# Patient Record
Sex: FEMALE | Race: WHITE | NOT HISPANIC OR LATINO | URBAN - METROPOLITAN AREA
[De-identification: names, ages, dates, MRNs, and addresses within clinical notes are randomized per-mention and may not be internally consistent; named-entity substitution may affect disease eponyms.]

---

## 2020-10-21 PROBLEM — Z00.00 ENCOUNTER FOR PREVENTIVE HEALTH EXAMINATION: Status: ACTIVE | Noted: 2020-10-21

## 2020-11-09 ENCOUNTER — OUTPATIENT (OUTPATIENT)
Dept: OUTPATIENT SERVICES | Facility: HOSPITAL | Age: 50
LOS: 1 days | Discharge: HOME | End: 2020-11-09

## 2020-11-09 DIAGNOSIS — D68.9 COAGULATION DEFECT, UNSPECIFIED: ICD-10-CM

## 2020-11-24 ENCOUNTER — APPOINTMENT (OUTPATIENT)
Dept: HEMATOLOGY ONCOLOGY | Facility: CLINIC | Age: 50
End: 2020-11-24
Payer: COMMERCIAL

## 2020-11-24 DIAGNOSIS — R23.8 OTHER SKIN CHANGES: ICD-10-CM

## 2020-11-24 DIAGNOSIS — Z78.9 OTHER SPECIFIED HEALTH STATUS: ICD-10-CM

## 2020-11-24 DIAGNOSIS — D89.40 MAST CELL ACTIVATION, UNSPECIFIED: ICD-10-CM

## 2020-11-24 PROCEDURE — 99205 OFFICE O/P NEW HI 60 MIN: CPT | Mod: 95

## 2020-11-25 RX ORDER — KETOTIFEN FUMARATE 0.25 MG/ML
0.03 SOLUTION/ DROPS OPHTHALMIC
Refills: 0 | Status: ACTIVE | COMMUNITY
Start: 2020-11-25

## 2020-11-25 NOTE — ASSESSMENT
[FreeTextEntry1] : This is a 50 year old woman with a complex medical history, ischemic colitis in 2002, negative hypergoagulable state workup, patient had a transiently positive antiphospholipid antibody.  Given that this was long ago and had been worked up and has not resurfaced as a problem since.  Will focus on the bleeding issues.  Patient has a short PTT recently when it was normal in the past, and easy bruising and bleeding and epistaxis.  Explained that the short PTT does not predict for hypercoagulable states,. Will run a bleeding workup which may also explain the lower PTT.  CHeck VWF Ag, Act, and multimer study, PT/PTT with mixing study, Factors V, VIII, IX, X, XI, XII along with Fibrinogen rule out bleeding disorder.  Would typically request a platelet aggregation but given patient is on multiple antihistamines and is quite symptomatic without them, will defer this for now.  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  Recommend she check a tryptase level during episodes of symptoms as she states that her tryptase had never been very elevated.  Can run KIT mutation. Patient already had flow cytometry run which identified an increased population of NK/T cell she states.  \par \par Explained to patient that the abnormal ferritin of 10 is a minor issue, the improtant aspect of these iron parameters are the normal hg of >13, and normal MCV, suggesting that her iron storage is mostly normal. She just needs some additional iron in her diet.  Given hx of IBS would not try supplements, can make the same effect with dietary iron through red meat.   \par \par \par Spent > 75 minutes in direct patient care and and addressed all questions and concerns.  100% of this time was in direct patient contact via [employer].
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
## 2020-11-25 NOTE — REVIEW OF SYSTEMS
[FreeTextEntry7] : Imitable bowel syndrome, hx of ischemic colitis.   [FreeTextEntry9] : lower back pain, and neck pain.  SI joints, left hip.  Subluxations in shoulder, right knee.   [de-identified] : occasional rash with the mast cell syndrome.  Poor wound healing.   [de-identified] : Weakness in legs, neuropathies, small fiber neuropathy negative.

## 2021-07-13 ENCOUNTER — NEW PATIENT (OUTPATIENT)
Dept: URBAN - METROPOLITAN AREA CLINIC 39 | Facility: CLINIC | Age: 51
End: 2021-07-13

## 2021-07-13 DIAGNOSIS — H43.393: ICD-10-CM

## 2021-07-13 DIAGNOSIS — H25.13: ICD-10-CM

## 2021-07-13 PROCEDURE — 92201 OPSCPY EXTND RTA DRAW UNI/BI: CPT

## 2021-07-13 PROCEDURE — 92250 FUNDUS PHOTOGRAPHY W/I&R: CPT

## 2021-07-13 PROCEDURE — 92134 CPTRZ OPH DX IMG PST SGM RTA: CPT

## 2021-07-13 PROCEDURE — 99203 OFFICE O/P NEW LOW 30 MIN: CPT

## 2021-07-13 ASSESSMENT — TONOMETRY
OS_IOP_MMHG: 14
OD_IOP_MMHG: 14

## 2021-07-13 ASSESSMENT — VISUAL ACUITY
OD_CC: 20/16
OS_CC: 20/20

## 2021-12-03 ENCOUNTER — NON-APPOINTMENT (OUTPATIENT)
Age: 51
End: 2021-12-03

## 2021-12-08 ENCOUNTER — OUTPATIENT (OUTPATIENT)
Dept: OUTPATIENT SERVICES | Facility: HOSPITAL | Age: 51
LOS: 1 days | Discharge: HOME | End: 2021-12-08

## 2021-12-08 DIAGNOSIS — D68.9 COAGULATION DEFECT, UNSPECIFIED: ICD-10-CM

## 2021-12-09 ENCOUNTER — OUTPATIENT (OUTPATIENT)
Dept: OUTPATIENT SERVICES | Facility: HOSPITAL | Age: 51
LOS: 1 days | Discharge: HOME | End: 2021-12-09

## 2021-12-09 DIAGNOSIS — D68.9 COAGULATION DEFECT, UNSPECIFIED: ICD-10-CM

## 2021-12-13 ENCOUNTER — APPOINTMENT (OUTPATIENT)
Dept: HEMATOLOGY ONCOLOGY | Facility: CLINIC | Age: 51
End: 2021-12-13
Payer: COMMERCIAL

## 2021-12-13 PROCEDURE — 99214 OFFICE O/P EST MOD 30 MIN: CPT | Mod: 95

## 2021-12-14 NOTE — ASSESSMENT
[FreeTextEntry1] : This is a 51 year old woman with a complex medical history, ischemic colitis in 2002, negative hypercoagulable state workup, patient had a transiently positive antiphospholipid antibody.  Given that this was long ago and had been worked up and has not resurfaced as a problem since.  Will focus on the bleeding issues.  Patient has a short PTT recently when it was normal in the past, and easy bruising and bleeding and epistaxis.  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  Recommend she check a tryptase level during episodes of symptoms as she states that her tryptase had never been very elevated. \par \par On more recent bloodwork  12/8/2021 WBC 5.7 Hg 14.0g/dl platelets 254.  Lymphocyte count 3.7 which is significantly lower than prior.   Flow cytometry not yet reported.    CMP normal.   Ferritin was improved slightly to 18ng/ml up from 10.   Tryptase normal at 5.4. \par \par will wait a few days for the flow cytometry to be reported and update her.  \par \par
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
## 2021-12-14 NOTE — REVIEW OF SYSTEMS
[Fatigue] : fatigue [Joint Pain] : joint pain [Joint Stiffness] : joint stiffness [Negative] : Neurological

## 2021-12-17 ENCOUNTER — NON-APPOINTMENT (OUTPATIENT)
Age: 51
End: 2021-12-17